# Patient Record
Sex: MALE | Race: WHITE
[De-identification: names, ages, dates, MRNs, and addresses within clinical notes are randomized per-mention and may not be internally consistent; named-entity substitution may affect disease eponyms.]

---

## 2017-07-17 ENCOUNTER — HOSPITAL ENCOUNTER (EMERGENCY)
Dept: HOSPITAL 17 - NEPK | Age: 62
Discharge: HOME | End: 2017-07-17
Payer: COMMERCIAL

## 2017-07-17 VITALS
SYSTOLIC BLOOD PRESSURE: 140 MMHG | HEART RATE: 89 BPM | DIASTOLIC BLOOD PRESSURE: 70 MMHG | TEMPERATURE: 98.4 F | OXYGEN SATURATION: 99 % | RESPIRATION RATE: 16 BRPM

## 2017-07-17 VITALS — BODY MASS INDEX: 25.25 KG/M2 | WEIGHT: 176.37 LBS | HEIGHT: 70 IN

## 2017-07-17 DIAGNOSIS — M54.9: ICD-10-CM

## 2017-07-17 DIAGNOSIS — M62.838: Primary | ICD-10-CM

## 2017-07-17 DIAGNOSIS — G89.29: ICD-10-CM

## 2017-07-17 PROCEDURE — 99284 EMERGENCY DEPT VISIT MOD MDM: CPT

## 2017-07-17 NOTE — PD
HPI


.


muscle spasm


Chief Complaint:  Pain: Acute or Chronic


Time Seen by Provider:  16:22


Travel History


International Travel<30 days:  No


Contact w/Intl Traveler<30days:  No


Traveled to known affect area:  No





History of Present Illness


HPI


62 yr old male with chronic back pain with morphine pump in place here with c/o 

acute on chronic pain. Patient says that every year he has these muscle spasms 

in his left thigh and he comes in here and gets a cocktail of medicines that 

relieve his pain. I reviewed records and patient was given Dilaudid, Ativan and 

Zofran. I discussed muscle spasms with him. He took a muscle relaxer earlier 

today. He is open to trying IM muscle relaxer. His pain doctor is in [place].





PFSH


Past Medical History


Hx Anticoagulant Therapy:  No


Arthritis:  Yes


Depression:  Yes


Cardiovascular Problems:  Yes


COPD:  Yes


Diabetes:  Yes


Diminished Hearing:  No


Gastrointestinal Disorders:  No


Genitourinary:  Yes


Hepatitis:  Yes (C)


Hypertension:  Yes


Kidney Stones:  Yes


Reproductive:  Yes


Immunizations Current:  No





Past Surgical History


Body Medical Devices:  MORPHINE INFUSION PUMP


Genitourinary Surgery:  Yes (HERNIA)


Gynecologic Surgery:  No


Neurologic Surgery:  Yes (MULTI BACK AND NECK NECK PLATED BACK HAS RODS)


Other Surgery:  Yes (IMPLANTED MORPHINE PUMP)





Social History


Alcohol Use:  No (quit 20yrs ago)


Tobacco Use:  Yes (3-4 CIG PER DAY )


Substance Use:  No





Allergies-Medications


(Allergen,Severity, Reaction):  


Coded Allergies:  


     Lyrica (Unverified  Allergy, Severe, 5/10/16)


     Ambien (Verified  Allergy, Mild, sleep walking, 5/10/16)


Reported Meds & Prescriptions





Reported Meds & Active Scripts


Active


Doxycycline Hyclate 100 mg (Doxycycline Hyclate) 100 Mg Tab 100 Mg PO BID 


Reported


Opana 10 Mg Tab (Oxymorphone) 10 Mg Tab 10 Mg PO BID 


Oxycodone (Oxycodone HCl) 15 Mg Tab 15 Mg PO Q6H PRN


Metformin (Metformin HCl) 500 Mg Tab 1,000 Mg PO BIDPC 


Metoprolol Tartrate 25 mg (Metoprolol Tartrate) 25 Mg Tab 1 Tab PO BID 


Flexeril (Cyclobenzaprine HCl) 10 Mg Tab 10 Mg PO TID 


[Morphine pump]   10 Mg IMPLANPUMP DAILY 








Review of Systems


General / Constitutional:  No: Fever


Eyes:  No: Visual changes


HENT:  No: Headaches


Cardiovascular:  No: Chest Pain or Discomfort


Respiratory:  No: Shortness of Breath


Gastrointestinal:  No: Abdominal Pain


Genitourinary:  No: Dysuria


Musculoskeletal:  Positive: Myalgias,  No: Pain


Skin:  No Rash


Neurologic:  No: Weakness


Psychiatric:  No: Depression


Endocrine:  No: Polydipsia


Hematologic/Lymphatic:  No: Easy Bruising





Physical Exam


Narrative


GENERAL: AAO x 3, no acute distress, Well-nourished, well-developed patient.


SKIN: Warm and dry. No visible rashes or bruising. 


HEAD: Normocephalic and atraumatic.


EYES: No scleral icterus. No injection or drainage. 


ENT: No nasal drainage noted. Mucous membranes pink. Airway patent. 


NECK: Supple, trachea midline. No JVD.


CARDIOVASCULAR: Regular rate and rhythm without murmurs, gallops, or rubs. 


RESPIRATORY: Breath sounds equal bilaterally. No accessory muscle use. No 

rhonchi or rales. 


GASTROINTESTINAL: visual inspection normal 


EXTREMITIES: No cyanosis or edema. no tenderness to palpation of left thigh


BACK: Nontender without obvious deformity. 


NEURO: CN II-12 intact,  strength normal b/l, UE and LE 5/5, no focal 

deficits


PSYCH: AAO x 3, normal affect.





Data


Data


Last Documented VS





Vital Signs








  Date Time  Temp Pulse Resp B/P Pulse Ox O2 Delivery O2 Flow Rate FiO2


 


7/17/17 16:05 98.4 89 16 140/70 99   








Orders








 Orphenadrine Inj (Norflex Inj) (7/17/17 16:30)


Ketorolac Inj (Toradol Inj) (7/17/17 16:30)











MDM


Medical Decision Making


Medical Screen Exam Complete:  Yes


Emergency Medical Condition:  Yes


Medical Record Reviewed:  Yes


Differential Diagnosis


muscle spasm, acute on chronic pain, sciatica


Narrative Course


62-year-old male here with complaints of muscle spasm.


He does not have any gross abnormalities on examination.


I reviewed his records and he is requesting Dilaudid, Ativan and Zofran.


I have advised him that I will provide him with some Norflex and Toradol.


He can continue his home medications.


I advised him that he needs to follow-up with his pain management specialist.





Diagnosis





 Primary Impression:  


 Muscle spasm


 Additional Impression:  


 Chronic back pain


 Qualified Code:  M54.9 - Chronic back pain, unspecified back location, 

unspecified back pain laterality


Patient Instructions:  General Instructions


***Med/Other Pt SpecificInfo:  No Change to Meds


Disposition:  01 DISCHARGE HOME


Condition:  Stable








Evelina Correia Jul 17, 2017 16:22

## 2017-12-24 ENCOUNTER — HOSPITAL ENCOUNTER (EMERGENCY)
Dept: HOSPITAL 17 - NEPE | Age: 62
Discharge: HOME | End: 2017-12-24
Payer: COMMERCIAL

## 2017-12-24 VITALS — HEIGHT: 68 IN | WEIGHT: 171.96 LBS | BODY MASS INDEX: 26.06 KG/M2

## 2017-12-24 VITALS
HEART RATE: 89 BPM | DIASTOLIC BLOOD PRESSURE: 76 MMHG | RESPIRATION RATE: 12 BRPM | SYSTOLIC BLOOD PRESSURE: 124 MMHG | TEMPERATURE: 97.6 F | OXYGEN SATURATION: 96 %

## 2017-12-24 DIAGNOSIS — L98.419: Primary | ICD-10-CM

## 2017-12-24 DIAGNOSIS — Z79.84: ICD-10-CM

## 2017-12-24 DIAGNOSIS — F17.210: ICD-10-CM

## 2017-12-24 DIAGNOSIS — I10: ICD-10-CM

## 2017-12-24 DIAGNOSIS — E11.9: ICD-10-CM

## 2017-12-24 PROCEDURE — 99283 EMERGENCY DEPT VISIT LOW MDM: CPT

## 2017-12-24 NOTE — PD
HPI


Chief Complaint:  Skin Problem


Time Seen by Provider:  10:42


Travel History


International Travel<30 days:  No


Contact w/Intl Traveler<30days:  No


Traveled to known affect area:  No





History of Present Illness


HPI


62-year-old male presents emergency department for evaluation of abscess to his 

right buttock, the patient states been there for over a month, he states just 

concerned because is painful when he sits on it and has to sit upright for 

dinner tomorrow.  Denies any fever, he is completed a course of antibiotics, 

denies any fevers chills diarrhea constipation or increasing discharge.  

Symptoms are moderate, right buttock, context as above, associated signs 

symptoms as above





PFSH


Past Medical History


Hx Anticoagulant Therapy:  No


Arthritis:  Yes


Depression:  Yes


Cardiovascular Problems:  Yes


COPD:  Yes


Diabetes:  Yes


Patient Takes Glucophage:  No


Diminished Hearing:  No


Gastrointestinal Disorders:  No


Genitourinary:  Yes


Hepatitis:  Yes (C)


Hypertension:  Yes


Kidney Stones:  Yes


Reproductive:  Yes


Immunizations Current:  No





Past Surgical History


Body Medical Devices:  MORPHINE INFUSION PUMP


Genitourinary Surgery:  Yes (HERNIA)


Gynecologic Surgery:  No


Neurologic Surgery:  Yes (MULTI BACK AND NECK NECK PLATED BACK HAS RODS)


Other Surgery:  Yes (IMPLANTED MORPHINE PUMP)





Social History


Alcohol Use:  No (quit 28yrs ago)


Tobacco Use:  Yes (3-4 CIG PER DAY )


Substance Use:  No





Allergies-Medications


(Allergen,Severity, Reaction):  


Coded Allergies:  


     pregabalin (Unverified  Allergy, Severe, 12/24/17)


     zolpidem (Unverified  Allergy, Mild, sleep walking, 12/24/17)


     duloxetine (Verified  Adverse Reaction, Intermediate, walks in his sleep, 

12/24/17)


Reported Meds & Prescriptions





Reported Meds & Active Scripts


Active


Lido Rx Topical (Lidocaine HCl) 3 % Jel 1 Applic TOPICAL Q4HR PRN


Doxycycline Hyclate 100 mg (Doxycycline Hyclate) 100 Mg Tab 100 Mg PO BID


Reported


Opana 10 Mg Tab (Oxymorphone) 10 Mg Tab 10 Mg PO BID


Oxycodone (Oxycodone HCl) 15 Mg Tab 15 Mg PO Q6H PRN


Glucophage XR 24 HR (Metformin HCl) 500 Mg Tab 1,000 Mg PO BIDPC


Metoprolol Tartrate 25 mg (Metoprolol Tartrate) 25 Mg Tab 1 Tab PO BID


Flexeril (Cyclobenzaprine HCl) 10 Mg Tab 10 Mg PO TID


[Morphine pump]   10 Mg IMPLANPUMP DAILY








Review of Systems


Except as stated in HPI:  all other systems reviewed are Neg





Physical Exam


Narrative


GENERAL: Well-nourished, well-developed patient.


SKIN: Focused skin assessment warm/dry.  On the right buttock there is a small 

michael-sized ulcer, no surrounding cellulitis nor abscess no induration.  It is 

superficial, not exposing any deep fatty tissue..


HEAD: Normocephalic.


EYES: No scleral icterus. No injection or drainage. 


NECK: Supple, trachea midline. No JVD or lymphadenopathy.


CARDIOVASCULAR: Regular rate and rhythm without murmurs, gallops, or rubs. 


RESPIRATORY: Breath sounds equal bilaterally. No accessory muscle use.


GASTROINTESTINAL: Abdomen soft, non-tender, nondistended. 


MUSCULOSKELETAL: No cyanosis, or edema. 


BACK: Nontender without obvious deformity. No CVA tenderness.





Data


Data


Last Documented VS





Vital Signs








  Date Time  Temp Pulse Resp B/P (MAP) Pulse Ox O2 Delivery O2 Flow Rate FiO2


 


12/24/17 11:58        


 


12/24/17 09:47 97.6 89 12  96   








Orders





 Orders


Lidocaine 2% Jelly (Xylocaine 2% Jelly) (12/24/17 11:00)


Lidocaine 2% Jelly (Xylocaine 2% Jelly) (12/24/17 11:30)


Ed Discharge Order (12/24/17 11:27)








[facility]


Medical Decision Making


Medical Screen Exam Complete:  Yes


Emergency Medical Condition:  Yes


Differential Diagnosis


Skin ulcer, cellulitis, abscess


Narrative Course


Patient roomed in emergency department, the wound appears to be a remnant of a 

healing abscess, does not appear infected at this time.  No indication for 

antibodies, was given lidocaine by a topical application, he is feeling better, 

stable for discharge.  The patient also requested a referral to an orthopedic 

because he's got chronic shoulder pain am happy to oblige.  This is a routine 

referral and certainly not emergent.





Diagnosis





 Primary Impression:  


 Skin ulcer of buttock


Referrals:  


Kota Johnston MD


***Med/Other Pt SpecificInfo:  Prescription(s) given


Scripts


Lidocaine Topical (Lido Rx Topical) 3 % Jel


1 APPLIC TOPICAL Q4HR Y for PAIN SCALE 1 TO 10, #5 GM 0 Refills


   Prov: Pete Johnston MD         12/24/17


Disposition:  01 DISCHARGE HOME


Condition:  Stable











Pete Johnston MD Dec 24, 2017 10:55

## 2018-01-03 ENCOUNTER — HOSPITAL ENCOUNTER (EMERGENCY)
Dept: HOSPITAL 17 - NEPD | Age: 63
LOS: 1 days | Discharge: HOME | End: 2018-01-04
Payer: COMMERCIAL

## 2018-01-03 VITALS
HEART RATE: 92 BPM | RESPIRATION RATE: 18 BRPM | TEMPERATURE: 97.9 F | SYSTOLIC BLOOD PRESSURE: 134 MMHG | OXYGEN SATURATION: 96 % | DIASTOLIC BLOOD PRESSURE: 84 MMHG

## 2018-01-03 VITALS — BODY MASS INDEX: 24.54 KG/M2 | WEIGHT: 173.37 LBS | HEIGHT: 70.5 IN

## 2018-01-03 DIAGNOSIS — M62.838: Primary | ICD-10-CM

## 2018-01-03 DIAGNOSIS — G89.29: ICD-10-CM

## 2018-01-03 DIAGNOSIS — F32.9: ICD-10-CM

## 2018-01-03 DIAGNOSIS — F17.210: ICD-10-CM

## 2018-01-03 DIAGNOSIS — E11.9: ICD-10-CM

## 2018-01-03 DIAGNOSIS — I10: ICD-10-CM

## 2018-01-03 DIAGNOSIS — J44.9: ICD-10-CM

## 2018-01-03 DIAGNOSIS — M54.9: ICD-10-CM

## 2018-01-03 PROCEDURE — 99284 EMERGENCY DEPT VISIT MOD MDM: CPT

## 2018-01-03 PROCEDURE — 96372 THER/PROPH/DIAG INJ SC/IM: CPT

## 2018-01-03 NOTE — PD
HPI


Chief Complaint:  Pain: Acute or Chronic


Time Seen by Provider:  22:32


Travel History


International Travel<30 days:  No


Contact w/Intl Traveler<30days:  No


Traveled to known affect area:  No





History of Present Illness


HPI


Patient is a 62-year-old male seen by me last month presents emergency 

department for evaluation of leg spasm his left lower extremity.  Patient 

states is happened to him intermittently and usually has to come in and get a 

shot to stop it.  States he has multiple back surgeries and has chronic back 

pain at baseline.  He also has a morphine pump implanted subcutaneously to help 

him with his pain control.  States his symptoms started approximately 2 hours 

prior to arrival has not tried anything to alleviate his symptoms.  Denies any 

weakness denies any bladder or bowel incontinence or retention.  Denies any 

fevers denies any injuries.,  Symptoms are moderate, context as above, 

associated signs symptoms as above, duration as above.





PFSH


Past Medical History


Hx Anticoagulant Therapy:  No


Arthritis:  Yes


Depression:  Yes


Cardiovascular Problems:  Yes (HTN)


COPD:  Yes


Diabetes:  Yes


Patient Takes Glucophage:  No


Diminished Hearing:  No


Gastrointestinal Disorders:  No


Genitourinary:  Yes


Hepatitis:  Yes (C)


Hypertension:  Yes


Kidney Stones:  Yes


Reproductive:  Yes


Respiratory:  Yes (COPD)


Immunizations Current:  No





Past Surgical History


Body Medical Devices:  MORPHINE INFUSION PUMP


Genitourinary Surgery:  Yes (HERNIA)


Gynecologic Surgery:  No


Neurologic Surgery:  Yes (MULTI BACK AND NECK NECK PLATED BACK HAS RODS)


Other Surgery:  Yes (IMPLANTED MORPHINE PUMP)





Social History


Alcohol Use:  No (quit 28yrs ago)


Tobacco Use:  Yes (3-4 CIG PER DAY )


Substance Use:  No





Allergies-Medications


(Allergen,Severity, Reaction):  


Coded Allergies:  


     pregabalin (Unverified  Allergy, Severe, 12/24/17)


     zolpidem (Unverified  Allergy, Mild, sleep walking, 12/24/17)


     duloxetine (Verified  Adverse Reaction, Intermediate, walks in his sleep, 

12/24/17)


Reported Meds & Prescriptions





Reported Meds & Active Scripts


Active


Flexeril (Cyclobenzaprine HCl) 10 Mg Tab 10 Mg PO TID


Lido Rx Topical (Lidocaine HCl) 3 % Jel 1 Applic TOPICAL Q4HR PRN


Reported


Metformin (Metformin HCl) 1,000 Mg Tab 1,000 Mg PO DAILY


     With a meal


Metoprolol Tartrate 25 Mg Tab 25 Mg PO BID


[Morphine pump]   10 Mg IMPLANPUMP DAILY








Review of Systems


Except as stated in HPI:  all other systems reviewed are Neg





Physical Exam


Narrative


GENERAL: Well-nourished, well-developed patient.  In no obvious distress.


SKIN: Focused skin assessment warm/dry.


HEAD: Normocephalic.


EYES: No scleral icterus. No injection or drainage. 


NECK: Supple, trachea midline. No JVD or lymphadenopathy.


CARDIOVASCULAR: Regular rate and rhythm without murmurs, gallops, or rubs. 


RESPIRATORY: Breath sounds equal bilaterally. No accessory muscle use.


GASTROINTESTINAL: Abdomen soft, non-tender, nondistended. 


MUSCULOSKELETAL: No cyanosis, or edema.  No midline CT or L-spine tenderness, 

patient has 5 out of 5 strength in bilateral lower extremities and plantar 

flexion and extension at the knee.  He walks with a cane but does so in an even 

narrow-based fashion.  No foot drop observed.  The patient is having 

intermittent spasmatic clonic jerks of the left lower extremity where and he 

flexes at the knee and extends at the hip.  The fairly powerful and last only 

for a second or 2.  


NEUROLOGICAL: Cranial nerves II through XII are grossly intact and nonfocal, 

there is 5 out of 5 strength in all 4 extremities.  DTRs are 2+ bilaterally 

equal at the patella, there is no clonus in either ankle reflex.  Babinski is 

downgoing.  Remainder of his neurologic exam is within normal limits.


BACK: Nontender without obvious deformity. No CVA tenderness.





Data


Data


Last Documented VS





Vital Signs








  Date Time  Temp Pulse Resp B/P (MAP) Pulse Ox O2 Delivery O2 Flow Rate FiO2


 


1/4/18 00:03        


 


1/3/18 21:44 97.9 92 18  96   








Orders





 Orders


Orphenadrine Inj (Norflex Inj) (1/3/18 23:00)


Diazepam (Valium) (1/3/18 23:00)


Ed Discharge Order (1/3/18 23:57)








MDM


Medical Decision Making


Medical Screen Exam Complete:  Yes


Emergency Medical Condition:  Yes


Differential Diagnosis


Chronic back pain, sciatica, leg spasms,


Narrative Course


Patient roomed emergency department, review of his records show last time she 

got Norflex Dilaudid and Toradol which apparently alleviated his symptoms.  

Given the muscular nature of these spasms I had given him a dose of Valium and 

Norflex, he states that his symptoms were about 50% better.  I discussed with 

him that at this point is curious as to why he is having such powerful spasms 

and unfortunately an MRI is not an option for him as he has implanted hardware 

in the form of a morphine pump as well as metal in his neck and his low back 

which excludes him from having MRI done.  He is not having any red flags for 

cauda equina syndrome but is possible that the equipment in his low back may be 

stimulating a nerve at the lumbar root causing his symptoms.  I suggested to 

him that if the symptoms are not better that he needs to consider being 

admitted to the hospital for evaluation with further imaging including CAT scan 

and likely myelogram.  He verbalized understanding and agreement as well as the 

wrist to his ability to ambulate and long-term pain or disability but he would 

like to go home and try symptomatic management home first.  Flexeril has been 

written for him, at this time he stable for discharge and I discussed at length 

that if his symptoms are not resolving that he needs to return to the emergent 

department.  I also discussed symptoms of cauda equina syndrome and if these 

should occur he needs to return emergently to the our facility for further 

workup.





Diagnosis





 Primary Impression:  


 Muscle spasm


 Additional Impression:  


 Chronic back pain


Referrals:  


Nitin Wong MD


***Med/Other Pt SpecificInfo:  Prescription(s) given


Scripts


Cyclobenzaprine (Flexeril) 10 Mg Tab


10 MG PO TID for Muscle Spasm, #15 TAB 0 Refills


   Prov: Pete Johnston MD         1/3/18


Disposition:  01 DISCHARGE HOME


Condition:  Stable











Pete Johnston MD Nacho 3, 2018 22:37